# Patient Record
Sex: FEMALE | Race: WHITE
[De-identification: names, ages, dates, MRNs, and addresses within clinical notes are randomized per-mention and may not be internally consistent; named-entity substitution may affect disease eponyms.]

---

## 2021-02-09 ENCOUNTER — DASHBOARD ENCOUNTERS (OUTPATIENT)
Age: 56
End: 2021-02-09

## 2021-02-09 ENCOUNTER — OFFICE VISIT (OUTPATIENT)
Dept: URBAN - METROPOLITAN AREA CLINIC 115 | Facility: CLINIC | Age: 56
End: 2021-02-09
Payer: COMMERCIAL

## 2021-02-09 DIAGNOSIS — K59.01 CONSTIPATION: ICD-10-CM

## 2021-02-09 DIAGNOSIS — R19.4 CHANGE IN BOWEL HABITS: ICD-10-CM

## 2021-02-09 DIAGNOSIS — R14.3 GAS: ICD-10-CM

## 2021-02-09 DIAGNOSIS — R19.5 LOOSE STOOLS: ICD-10-CM

## 2021-02-09 PROBLEM — 14760008 CONSTIPATION: Status: ACTIVE | Noted: 2021-02-09

## 2021-02-09 PROCEDURE — G8417 CALC BMI ABV UP PARAM F/U: HCPCS | Performed by: INTERNAL MEDICINE

## 2021-02-09 PROCEDURE — 99214 OFFICE O/P EST MOD 30 MIN: CPT | Performed by: INTERNAL MEDICINE

## 2021-02-09 PROCEDURE — 3017F COLORECTAL CA SCREEN DOC REV: CPT | Performed by: INTERNAL MEDICINE

## 2021-02-09 PROCEDURE — G8482 FLU IMMUNIZE ORDER/ADMIN: HCPCS | Performed by: INTERNAL MEDICINE

## 2021-02-09 PROCEDURE — G8427 DOCREV CUR MEDS BY ELIG CLIN: HCPCS | Performed by: INTERNAL MEDICINE

## 2021-02-09 PROCEDURE — 1036F TOBACCO NON-USER: CPT | Performed by: INTERNAL MEDICINE

## 2021-02-09 NOTE — HPI-TODAY'S VISIT:
The patient is a 55 year old female who is present for a gastroenterologist visit for evaluation for constipation.   2/9/21  Patient reports that she began dieting and it has since had a change in her bowel habits. She initially was constipated but is now having more loose stools. She is not eating as much fast food and she has been exercising more. She has lost 20 pouds. Her current diet consists of 1-2 servings of fruits and vegetables at lunch and 2 -3 with fruits and vegatables at dinner. She has used colace with no benefit and then miralax intermittently. Bowel freqeuncy of once every 2 to 3 days. She is still not having daily BM but she has not used it. She spends 10 minutes in the bathroom but her stools are softer and a larger quantinty.  Admits to gas and loose stools. Admits that she has felt stressed in the past 3 months.  Admits to stess urinary incontinence.  Denies fecal incontinence.  Patient has had both of her COVID-19 shots.   Time spent with patient: 11 min.

## 2022-09-07 ENCOUNTER — OFFICE VISIT (OUTPATIENT)
Dept: URBAN - METROPOLITAN AREA CLINIC 92 | Facility: CLINIC | Age: 57
End: 2022-09-07